# Patient Record
Sex: MALE | Race: WHITE | NOT HISPANIC OR LATINO | Employment: OTHER | URBAN - METROPOLITAN AREA
[De-identification: names, ages, dates, MRNs, and addresses within clinical notes are randomized per-mention and may not be internally consistent; named-entity substitution may affect disease eponyms.]

---

## 2020-06-09 ENCOUNTER — APPOINTMENT (OUTPATIENT)
Dept: GENERAL RADIOLOGY | Facility: HOSPITAL | Age: 40
End: 2020-06-09

## 2020-06-09 ENCOUNTER — HOSPITAL ENCOUNTER (EMERGENCY)
Facility: HOSPITAL | Age: 40
Discharge: HOME | End: 2020-06-09
Admitting: EMERGENCY MEDICINE

## 2020-06-09 VITALS
HEART RATE: 72 BPM | OXYGEN SATURATION: 99 % | HEIGHT: 71 IN | BODY MASS INDEX: 21.27 KG/M2 | RESPIRATION RATE: 15 BRPM | SYSTOLIC BLOOD PRESSURE: 146 MMHG | WEIGHT: 151.9 LBS | DIASTOLIC BLOOD PRESSURE: 82 MMHG | TEMPERATURE: 98.5 F

## 2020-06-09 DIAGNOSIS — S61.412A HAND LACERATION INVOLVING TENDON, LEFT, INITIAL ENCOUNTER: Primary | ICD-10-CM

## 2020-06-09 DIAGNOSIS — S66.922A HAND LACERATION INVOLVING TENDON, LEFT, INITIAL ENCOUNTER: Primary | ICD-10-CM

## 2020-06-09 PROCEDURE — 73130 X-RAY EXAM OF HAND: CPT

## 2020-06-09 PROCEDURE — 25010000002 TDAP 5-2.5-18.5 LF-MCG/0.5 SUSPENSION: Performed by: NURSE PRACTITIONER

## 2020-06-09 PROCEDURE — 99283 EMERGENCY DEPT VISIT LOW MDM: CPT

## 2020-06-09 PROCEDURE — 90471 IMMUNIZATION ADMIN: CPT | Performed by: NURSE PRACTITIONER

## 2020-06-09 PROCEDURE — 90715 TDAP VACCINE 7 YRS/> IM: CPT | Performed by: NURSE PRACTITIONER

## 2020-06-09 RX ORDER — HYDROCODONE BITARTRATE AND ACETAMINOPHEN 5; 325 MG/1; MG/1
1 TABLET ORAL EVERY 6 HOURS PRN
Qty: 12 TABLET | Refills: 0 | Status: SHIPPED | OUTPATIENT
Start: 2020-06-09 | End: 2023-04-04

## 2020-06-09 RX ORDER — CEPHALEXIN 500 MG/1
500 CAPSULE ORAL 4 TIMES DAILY
Qty: 28 CAPSULE | Refills: 0 | Status: SHIPPED | OUTPATIENT
Start: 2020-06-09 | End: 2023-04-04

## 2020-06-09 RX ADMIN — TETANUS TOXOID, REDUCED DIPHTHERIA TOXOID AND ACELLULAR PERTUSSIS VACCINE, ADSORBED 0.5 ML: 5; 2.5; 8; 8; 2.5 SUSPENSION INTRAMUSCULAR at 13:57

## 2020-06-09 NOTE — CONSULTS
Procedure Report    Patient Name:  Juanito Arambula  YOB: 1980    Date of Surgery:  06/09/2020     Indications: Lacerated wound left hand with tear of the extensor tendon of the little finger    Pre-op Diagnosis:   Partial tear of the extensor tendon of the left little finger       Post-Op Diagnosis Codes:  The same    Procedure/CPT® Codes:          Staff:      * Surgery not found *    Anesthesia: * Local anesthesia using 5 cc of 1% lidocaine  IV Fluids         Estimated Blood Loss: Minimal  Tourniquet :  Time: Not used    Pressure: Not used     Findings: Partial laceration of the extensor tendon of the left little finger with open wound    Specimens: None    Complications: None apparent        Description of Procedure: Patient was properly identified PAR Q held wound was thoroughly cleaned and 5 cc of 1% lidocaine was infiltrated around the wound.  The wound was thoroughly irrigated with normal saline and Betadine.  Sterile drapes were applied.  The wound was again examined.  There was no foreign body or foreign material seen.  There is a partial tear of the extensor tendon of the little finger.  The wound was thoroughly cleaned again and the partial tear was repaired using interrupted sutures using 3-0 Ethibond.  The finger was then ranged and found to be satisfactory.  The wound was again thoroughly irrigated and the skin was closed with 3-0 nylon mattress sutures.  Wound was dressed and splint applied.      Standard protocol was observed at the beginning of the procedure identifying the patient's site of surgery nature of surgery including allergies.  At the end of the procedure sponge and needle count was found to be correct.      Post-Op Plan: Postop he will receive oral antibiotics for 1 week.  We will follow him back in 1 week time in the outpatient clinic.  He has been advised to keep his hand elevated.    Sahil Anguiano MD     Date: 6/9/2020    Time: 17:23

## 2020-06-09 NOTE — ED PROVIDER NOTES
"Subjective   Patient is a 39-year-old male who presents emergency department complaint of a hand laceration.  He reports he cut his hand underneath the mcconnell of a car.  He states he is unable to move his finger, his last tetanus immunization he thinks was around 5 years ago, unsure.  He last ate 2 to 3 hours ago          Review of Systems   Constitutional: Negative for fever.   Skin: Positive for wound.   Neurological: Positive for numbness.        Left pinky finger       History reviewed. No pertinent past medical history.    Allergies   Allergen Reactions   • Codeine Shortness Of Breath     Pt reports it \"slows his breathing\"   • Ampicillin Unknown - High Severity     Pt reports he can take amoxicillin and penicillin but not ampicillin       History reviewed. No pertinent surgical history.    No family history on file.    Social History     Socioeconomic History   • Marital status: Single     Spouse name: Not on file   • Number of children: Not on file   • Years of education: Not on file   • Highest education level: Not on file           Objective   Physical Exam   Constitutional: He is oriented to person, place, and time. He appears well-developed and well-nourished.   HENT:   Head: Normocephalic and atraumatic.   Eyes: Pupils are equal, round, and reactive to light. Conjunctivae and EOM are normal.   Neck: Normal range of motion. Neck supple.   Cardiovascular: Normal rate and regular rhythm.   Pulmonary/Chest: Effort normal.   Abdominal: Soft. Bowel sounds are normal.   Musculoskeletal:        Left hand: He exhibits decreased range of motion and laceration. He exhibits no tenderness, no bony tenderness, normal two-point discrimination, normal capillary refill and no deformity. Decreased sensation noted. Decreased strength noted.        Hands:  Deep laceration noted to the left MCP area between the fourth and the fifth digits, extending onto the volar aspect of the fifth digit.  Visible tendon injury is noted.  " "With extensor tendon deficit noted. Cap Refill brisk.    Limited range of motion to the fifth finger, decreased extension with decreased strength.   Neurological: He is alert and oriented to person, place, and time.   Skin: Skin is warm and dry. Capillary refill takes less than 2 seconds.   Psychiatric: He has a normal mood and affect. His behavior is normal. Judgment and thought content normal.   Nursing note and vitals reviewed.      Procedures           ED Course  /85   Pulse 75   Temp 98.2 °F (36.8 °C) (Oral)   Resp 20   Ht 180.3 cm (71\")   Wt 68.9 kg (151 lb 14.4 oz)   SpO2 97%   BMI 21.19 kg/m²   Labs Reviewed - No data to display  Medications   Tdap (BOOSTRIX) injection 0.5 mL (0.5 mL Intramuscular Given 6/9/20 1357)     Xr Hand 3+ View Left    Result Date: 6/9/2020  No acute osseous abnormality. No radiopaque foreign bodies identified.  Electronically Signed By-Ofelia Morales On:6/9/2020 2:26 PM This report was finalized on [id number] by  Ofelia Morales, .      ED Course as of Jun 09 1723   Tue Jun 09, 2020   1610 Dr. Verdugo at bedside.     [LB]   1654 Patient to follow-up with orthopedics in the office on the 16th, he will be placed on Keflex.    [LB]      ED Course User Index  [LB] Koki Graves, FRANSISCA           Appropriate PPE was worn during the duration of the care for this patient while in the emergency department per [facility] guidelines     Patient INSPECT report queried and reviewed.  I discussed with the patient the risk and benefits associated with opiate/narcotic pain medication today.  Based on patient's exam findings and assessment, I do feel it appropriate to prescribe a short course of opiate/ narcotic pain medication from the emergency department.  The patient was advised of the risks associated with such medication, including risk of dependency.  Patient was advised of medication administration instructions, appropriate use, potential side effects and adverse " reactions.  Acknowledged and verbalized understanding, and agrees to treatment.                           MDM  Number of Diagnoses or Management Options  Hand laceration involving tendon, left, initial encounter:   Diagnosis management comments: Co morbidities: None    Differentials: Laceration, tendon laceration, fracture  This list is not all inclusive and does not constitute the entireity of considered causes.   Labs: None    Imaging, Interpreted per radiologist, independently viewed by myself: No acute findings noted on x-ray    Patient was brought back to the emergency department room for evaluation and  placed on appropriate monitoring.  The above work-up was completed.    Plan and Disposition: Discharge home    Patient sustained a deep laceration to his left hand.  Upon initial exam it appeared the patient had a tendon laceration involving the left fifth digit.  Orthopedics was consulted, and Dr. Verdugo completed repair in the ED, please see note.  Will be placed in a splint, placed on Keflex and a short course of pain medication, and follow-up in the office.    I discussed with the patient their test results today, plan of care, follow-up and return precautions.  Opportunities provided as questions.  All questions concerns were addressed at the bedside.    Patient is aware of signs and symptoms that require immediate return to the emergency department.           Amount and/or Complexity of Data Reviewed  Tests in the radiology section of CPT®: reviewed  Discuss the patient with other providers: yes (Dr. Verdugo)    Patient Progress  Patient progress: stable      Final diagnoses:   Hand laceration involving tendon, left, initial encounter            Koki Graves, APRN  06/09/20 1727

## 2020-06-09 NOTE — CONSULTS
Referring Provider: Koki Graves APRN   Reason for Consultation: Extensor tendon laceration left little finger    Patient Care Team:  Provider, No Known as PCP - General    Chief complaint patient accidentally injured his left hand had a laceration at the level of the metacarpophalangeal joint of his left little finger on the dorsal aspect difficulty with extending his little finger.  He says at the time of injury the pain was constant throbbing.    Subjective .     History of present illness: Patient is presents to the [facility] ER with lacerated wound to the dorsal aspect of his left little finger.  He sustained this while working and injured his hand and sustained a deep laceration and was evaluated by the nurse practitioner identified care of the extensor tendon and was referred to me for further evaluation and treatment      History  History reviewed. No pertinent past medical history.    History reviewed. No pertinent surgical history.    No family history on file.    Social History     Tobacco Use   • Smoking status: Not on file   Substance Use Topics   • Alcohol use: Not on file   • Drug use: Not on file         (Not in a hospital admission)     Scheduled Meds:         Continuous Infusions:      No current facility-administered medications for this encounter.      PRN Meds:         Allergies:    Codeine and Ampicillin        Objective     Vital Signs   Temp:  [98.2 °F (36.8 °C)] 98.2 °F (36.8 °C)  Heart Rate:  [] 75  Resp:  [20] 20  BP: (130-164)/(75-92) 130/85    Physical Exam:    General Appearance:    Alert, cooperative, in no acute distress   Head:    Normocephalic, without obvious abnormality, atraumatic   Eyes:            Lids and lashes normal, conjunctivae and sclerae normal, no   icterus, no pallor, corneas clear, PERRLA   Ears:    Ears appear intact with no abnormalities noted   Throat:   No oral lesions, no thrush, oral mucosa moist   Neck:   No adenopathy, supple,  trachea midline, no thyromegaly, no   carotid bruit, no JVD   Back:     No kyphosis present, no scoliosis present, no skin lesions,      erythema or scars, no tenderness to percussion or                   palpation,   range of motion normal   Lungs:     Clear to auscultation,respirations regular, even and                  unlabored    Heart:    Regular rhythm and normal rate, normal S1 and S2, no            murmur, no gallop, no rub, no click   Chest Wall:    No abnormalities observed   Abdomen:     Normal bowel sounds, no masses, no organomegaly, soft        non-tender, non-distended, no guarding, no rebound                tenderness   Rectal:     Deferred   Extremities:  Left upper extremity there is a laceration on the dorsal aspect at the level of the metacarpal phalangeal joint on the dorsal aspect more towards the fourth finger.  Extensor tendon is exposed with partial tear of the tendon.   Pulses:  Radial pulses felt normal.   Skin:   No bleeding, bruising or rash   Lymph nodes:   No palpable adenopathy   Neurologic:  Sensation of the finger ulnar nerve and dorsal aspect are within normal limits.                            Results Review     Lab Results (most recent)     None           Imaging Results (Most Recent)     Procedure Component Value Units Date/Time    XR Hand 3+ View Left [[id number]] Collected:  06/09/20 1425     Updated:  06/09/20 1428    Narrative:       XR HAND 3+ VW LEFT-     Date of Exam: 6/9/2020 2:00 PM     Indication: laceration with tendon laceration.  Laceration of the fourth  and fifth digits     Comparison: None available.      Technique: 3 views of the hand were obtained.     FINDINGS:  No fractures of dislocations.  No soft tissue swelling.  Bone  mineral density is normal. No significant arthritic changes are present.   No periosteal reaction or soft tissue calcifications.  No osseous  erosions. No radiopaque foreign bodies identified.       Impression:       No acute osseous  abnormality. No radiopaque foreign bodies identified.     Electronically Signed By-Ofelia Morales On:6/9/2020 2:26 PM  This report was finalized on [id number] by  Ofelia Morales, .             I reviewed the patient's new clinical results.  I reviewed the patient's new imaging results and agree with the interpretation.      Assessment/Plan       * No active hospital problems. *      Partial tear of the extensor tendon of the little finger.  Plan is to suture it under local anesthesia with Ethibond.  This was explained to the patient in detail patient verbalizes understanding.  Under strict aseptic conditions the wound was thoroughly cleaned and the tendon sutured with a 3-0 Ethibond sutures.  The skin was closed with nylon.  Splint has been applied.  We will follow him back in the outpatient clinic in 1 week time.  Patient will have oral antibiotics for 1 week which would be Kefzol 500 mg p.o. 4 times daily.    I discussed the patient's findings and my recommendations with patient    Sahil Anguiano MD  06/09/20  17:18

## 2020-06-09 NOTE — ED NOTES
Patient stated that he cut the back of his left hand on the mcconnell of a car.  Laceration happened one hour ago.  Patient is having a hard time bending his pinky finger.     Dora Andrade RN  06/09/20 7815       Dora Andrade RN  06/09/20 0505

## 2020-06-09 NOTE — DISCHARGE INSTRUCTIONS
Please follow-up with Dr. Verdugo in the office as scheduled  Return to ED for new or worsening symptoms  Complete entire course of antibiotics

## 2021-06-24 ENCOUNTER — HOSPITAL ENCOUNTER (EMERGENCY)
Facility: HOSPITAL | Age: 41
Discharge: LEFT BEFORE DISCHARGE | End: 2021-06-25

## 2021-06-24 VITALS
OXYGEN SATURATION: 97 % | TEMPERATURE: 98.8 F | DIASTOLIC BLOOD PRESSURE: 72 MMHG | BODY MASS INDEX: 20.68 KG/M2 | SYSTOLIC BLOOD PRESSURE: 105 MMHG | HEART RATE: 102 BPM | HEIGHT: 71 IN | WEIGHT: 147.71 LBS | RESPIRATION RATE: 16 BRPM

## 2021-06-24 PROCEDURE — 99211 OFF/OP EST MAY X REQ PHY/QHP: CPT

## 2021-07-02 ENCOUNTER — APPOINTMENT (OUTPATIENT)
Dept: GENERAL RADIOLOGY | Facility: HOSPITAL | Age: 41
End: 2021-07-02

## 2021-07-02 ENCOUNTER — HOSPITAL ENCOUNTER (EMERGENCY)
Facility: HOSPITAL | Age: 41
Discharge: HOME | End: 2021-07-02
Attending: EMERGENCY MEDICINE | Admitting: EMERGENCY MEDICINE

## 2021-07-02 VITALS
OXYGEN SATURATION: 100 % | RESPIRATION RATE: 16 BRPM | SYSTOLIC BLOOD PRESSURE: 137 MMHG | HEIGHT: 71 IN | WEIGHT: 146.16 LBS | HEART RATE: 93 BPM | DIASTOLIC BLOOD PRESSURE: 85 MMHG | TEMPERATURE: 98 F | BODY MASS INDEX: 20.46 KG/M2

## 2021-07-02 DIAGNOSIS — M25.512 ACUTE PAIN OF LEFT SHOULDER: Primary | ICD-10-CM

## 2021-07-02 PROCEDURE — 73030 X-RAY EXAM OF SHOULDER: CPT

## 2021-07-02 PROCEDURE — 99282 EMERGENCY DEPT VISIT SF MDM: CPT

## 2021-07-02 RX ORDER — CYCLOBENZAPRINE HCL 10 MG
10 TABLET ORAL 3 TIMES DAILY PRN
Qty: 15 TABLET | Refills: 0 | Status: SHIPPED | OUTPATIENT
Start: 2021-07-02 | End: 2023-04-04

## 2021-07-02 RX ORDER — NAPROXEN 375 MG/1
375 TABLET ORAL 2 TIMES DAILY PRN
Qty: 14 TABLET | Refills: 0 | Status: SHIPPED | OUTPATIENT
Start: 2021-07-02 | End: 2023-04-04

## 2021-07-03 NOTE — ED PROVIDER NOTES
"Subjective   Patient is a 40-year-old male complaint of pain to his left shoulder.  This developed over the past several days.  No specific injury numbness tingling or other complaint.  The pain is mild but constant          Review of Systems  Negative for recent trauma numbness tingling weakness or other complaint  No past medical history on file.    Allergies   Allergen Reactions   • Codeine Shortness Of Breath     Pt reports it \"slows his breathing\"   • Ampicillin Unknown - High Severity     Pt reports he can take amoxicillin and penicillin but not ampicillin       Past Surgical History:   Procedure Laterality Date   • ADENOIDECTOMY     • HERNIA REPAIR     • TONSILLECTOMY         No family history on file.    Social History     Socioeconomic History   • Marital status: Single     Spouse name: Not on file   • Number of children: Not on file   • Years of education: Not on file   • Highest education level: Not on file   Tobacco Use   • Smoking status: Current Every Day Smoker     Packs/day: 0.50     Types: Cigarettes   Substance and Sexual Activity   • Alcohol use: Yes     Comment: socially    • Drug use: Yes     Frequency: 3.0 times per week     Types: Marijuana   • Sexual activity: Never           Objective   Physical Exam  Strohman exam shows pain to palpation about the patient's left posterior shoulder.  Has full range of motion with mild pain.  He has 2+ pulses and is neurovascularly intact.  Procedures           ED Course      XR Shoulder 2+ View Left    Result Date: 7/2/2021  No acute fracture or dislocation is identified of the left shoulder.  Electronically Signed By-Arti Pulliam MD On:7/2/2021 8:39 PM This report was finalized on [id number] by  Arti Pulliam MD.                                         MDM  Number of Diagnoses or Management Options  Diagnosis management comments: Patient has findings consistent with left shoulder strain.  Patient will be discharged with a prescription Naprosyn and " Flexeril.  He see his MD for recheck as needed.    Risk of Complications, Morbidity, and/or Mortality  Presenting problems: moderate  Diagnostic procedures: moderate  Management options: moderate    Patient Progress  Patient progress: stable      Final diagnoses:   Acute pain of left shoulder       ED Disposition  ED Disposition     ED Disposition Condition Comment    Discharge Stable           No follow-up provider specified.       Medication List      New Prescriptions    cyclobenzaprine 10 MG tablet  Commonly known as: FLEXERIL  Take 1 tablet by mouth 3 (Three) Times a Day As Needed for Muscle Spasms for up to 15 doses.     naproxen 375 MG tablet  Commonly known as: NAPROSYN  Take 1 tablet by mouth 2 (Two) Times a Day As Needed for Moderate Pain .           Where to Get Your Medications      These medications were sent to [id number] - [facility] [place] - [address] AT [facility] - [phone]  - [phone] FX  [address] [place] [address]    Phone: [phone]   · cyclobenzaprine 10 MG tablet  · naproxen 375 MG tablet          Gianfranco Romero MD  07/02/21 4184

## 2023-04-04 ENCOUNTER — HOSPITAL ENCOUNTER (OUTPATIENT)
Facility: HOSPITAL | Age: 43
Setting detail: OBSERVATION
Discharge: LEFT BEFORE DISCHARGE | End: 2023-04-05
Attending: EMERGENCY MEDICINE | Admitting: EMERGENCY MEDICINE
Payer: MEDICAID

## 2023-04-04 DIAGNOSIS — R07.9 CHEST PAIN, UNSPECIFIED TYPE: Primary | ICD-10-CM

## 2023-04-04 LAB
ALBUMIN SERPL-MCNC: 4.4 G/DL (ref 3.5–5.2)
ALBUMIN/GLOB SERPL: 1.8 G/DL
ALP SERPL-CCNC: 106 U/L (ref 39–117)
ALT SERPL W P-5'-P-CCNC: 15 U/L (ref 1–41)
AMPHET+METHAMPHET UR QL: NEGATIVE
ANION GAP SERPL CALCULATED.3IONS-SCNC: 10 MMOL/L (ref 5–15)
APTT PPP: 27.7 SECONDS (ref 61–76.5)
AST SERPL-CCNC: 22 U/L (ref 1–40)
BARBITURATES UR QL SCN: NEGATIVE
BASOPHILS # BLD AUTO: 0.1 10*3/MM3 (ref 0–0.2)
BASOPHILS NFR BLD AUTO: 0.5 % (ref 0–1.5)
BENZODIAZ UR QL SCN: NEGATIVE
BILIRUB SERPL-MCNC: 0.8 MG/DL (ref 0–1.2)
BILIRUB UR QL STRIP: NEGATIVE
BUN SERPL-MCNC: 15 MG/DL (ref 6–20)
BUN/CREAT SERPL: 16.9 (ref 7–25)
CALCIUM SPEC-SCNC: 9.7 MG/DL (ref 8.6–10.5)
CANNABINOIDS SERPL QL: POSITIVE
CHLORIDE SERPL-SCNC: 102 MMOL/L (ref 98–107)
CLARITY UR: CLEAR
CO2 SERPL-SCNC: 26 MMOL/L (ref 22–29)
COCAINE UR QL: NEGATIVE
COLOR UR: YELLOW
CREAT SERPL-MCNC: 0.89 MG/DL (ref 0.76–1.27)
DEPRECATED RDW RBC AUTO: 50.8 FL (ref 37–54)
EGFRCR SERPLBLD CKD-EPI 2021: 108.4 ML/MIN/1.73
EOSINOPHIL # BLD AUTO: 0.2 10*3/MM3 (ref 0–0.4)
EOSINOPHIL NFR BLD AUTO: 2 % (ref 0.3–6.2)
ERYTHROCYTE [DISTWIDTH] IN BLOOD BY AUTOMATED COUNT: 13.9 % (ref 12.3–15.4)
ERYTHROCYTE [SEDIMENTATION RATE] IN BLOOD: 2 MM/HR (ref 0–15)
GEN 5 2HR TROPONIN T REFLEX: <6 NG/L
GLOBULIN UR ELPH-MCNC: 2.5 GM/DL
GLUCOSE SERPL-MCNC: 107 MG/DL (ref 65–99)
GLUCOSE UR STRIP-MCNC: NEGATIVE MG/DL
HCT VFR BLD AUTO: 49.8 % (ref 37.5–51)
HGB BLD-MCNC: 16.7 G/DL (ref 13–17.7)
HGB UR QL STRIP.AUTO: NEGATIVE
INR PPP: 1.01 (ref 0.93–1.1)
KETONES UR QL STRIP: NEGATIVE
LEUKOCYTE ESTERASE UR QL STRIP.AUTO: NEGATIVE
LYMPHOCYTES # BLD AUTO: 2.1 10*3/MM3 (ref 0.7–3.1)
LYMPHOCYTES NFR BLD AUTO: 20.7 % (ref 19.6–45.3)
MCH RBC QN AUTO: 33.1 PG (ref 26.6–33)
MCHC RBC AUTO-ENTMCNC: 33.5 G/DL (ref 31.5–35.7)
MCV RBC AUTO: 98.8 FL (ref 79–97)
METHADONE UR QL SCN: NEGATIVE
MONOCYTES # BLD AUTO: 0.9 10*3/MM3 (ref 0.1–0.9)
MONOCYTES NFR BLD AUTO: 8.7 % (ref 5–12)
NEUTROPHILS NFR BLD AUTO: 6.8 10*3/MM3 (ref 1.7–7)
NEUTROPHILS NFR BLD AUTO: 68.1 % (ref 42.7–76)
NITRITE UR QL STRIP: NEGATIVE
NRBC BLD AUTO-RTO: 0.1 /100 WBC (ref 0–0.2)
OPIATES UR QL: NEGATIVE
OXYCODONE UR QL SCN: NEGATIVE
PH UR STRIP.AUTO: 5.5 [PH] (ref 5–8)
PLATELET # BLD AUTO: 218 10*3/MM3 (ref 140–450)
PMV BLD AUTO: 9.4 FL (ref 6–12)
POTASSIUM SERPL-SCNC: 5 MMOL/L (ref 3.5–5.2)
PROT SERPL-MCNC: 6.9 G/DL (ref 6–8.5)
PROT UR QL STRIP: NEGATIVE
PROTHROMBIN TIME: 10.4 SECONDS (ref 9.6–11.7)
RBC # BLD AUTO: 5.04 10*6/MM3 (ref 4.14–5.8)
SODIUM SERPL-SCNC: 138 MMOL/L (ref 136–145)
SP GR UR STRIP: 1.02 (ref 1–1.03)
TROPONIN T DELTA: NORMAL
TROPONIN T SERPL HS-MCNC: 7 NG/L
UROBILINOGEN UR QL STRIP: NORMAL
WBC NRBC COR # BLD: 10 10*3/MM3 (ref 3.4–10.8)
WHOLE BLOOD HOLD COAG: NORMAL
WHOLE BLOOD HOLD SPECIMEN: NORMAL

## 2023-04-04 PROCEDURE — 93005 ELECTROCARDIOGRAM TRACING: CPT

## 2023-04-04 PROCEDURE — 80307 DRUG TEST PRSMV CHEM ANLYZR: CPT | Performed by: NURSE PRACTITIONER

## 2023-04-04 PROCEDURE — 80053 COMPREHEN METABOLIC PANEL: CPT | Performed by: NURSE PRACTITIONER

## 2023-04-04 PROCEDURE — 85610 PROTHROMBIN TIME: CPT | Performed by: NURSE PRACTITIONER

## 2023-04-04 PROCEDURE — 85025 COMPLETE CBC W/AUTO DIFF WBC: CPT | Performed by: NURSE PRACTITIONER

## 2023-04-04 PROCEDURE — 81003 URINALYSIS AUTO W/O SCOPE: CPT | Performed by: NURSE PRACTITIONER

## 2023-04-04 PROCEDURE — 85730 THROMBOPLASTIN TIME PARTIAL: CPT | Performed by: NURSE PRACTITIONER

## 2023-04-04 PROCEDURE — 99285 EMERGENCY DEPT VISIT HI MDM: CPT

## 2023-04-04 PROCEDURE — G0378 HOSPITAL OBSERVATION PER HR: HCPCS

## 2023-04-04 PROCEDURE — 36415 COLL VENOUS BLD VENIPUNCTURE: CPT

## 2023-04-04 PROCEDURE — 84484 ASSAY OF TROPONIN QUANT: CPT | Performed by: NURSE PRACTITIONER

## 2023-04-04 PROCEDURE — 85652 RBC SED RATE AUTOMATED: CPT | Performed by: NURSE PRACTITIONER

## 2023-04-04 PROCEDURE — 93005 ELECTROCARDIOGRAM TRACING: CPT | Performed by: EMERGENCY MEDICINE

## 2023-04-04 RX ORDER — CLONIDINE HYDROCHLORIDE 0.1 MG/1
0.1 TABLET ORAL 2 TIMES DAILY
COMMUNITY

## 2023-04-04 RX ORDER — NITROGLYCERIN 0.4 MG/1
0.4 TABLET SUBLINGUAL
Status: DISCONTINUED | OUTPATIENT
Start: 2023-04-04 | End: 2023-04-05 | Stop reason: HOSPADM

## 2023-04-04 RX ORDER — SODIUM CHLORIDE 0.9 % (FLUSH) 0.9 %
10 SYRINGE (ML) INJECTION AS NEEDED
Status: DISCONTINUED | OUTPATIENT
Start: 2023-04-04 | End: 2023-04-05 | Stop reason: HOSPADM

## 2023-04-04 RX ADMIN — NITROGLYCERIN 1 INCH: 20 OINTMENT TOPICAL at 19:00

## 2023-04-04 RX ADMIN — NITROGLYCERIN 0.4 MG: 0.4 TABLET SUBLINGUAL at 18:33

## 2023-04-04 NOTE — ED PROVIDER NOTES
"Subjective   History of Present Illness  Chief complaint: chest pain      Context: Patient is a 42-year-old male from [facility] with complaints of chest pain for the last 3 days.  He states he has a squeezing sensation substernal nonradiating.  No associated nausea or diaphoresis.  States he has had increased stress last couple days after being at sunrise.  Does note some exertional dyspnea as well.  Denies any symmetrical pedal edema.  He denies any shortness of breath fever or upper respiratory complaints.  No urinary complaints.  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  He reports positive family history of cardiac disease has never had a stress test or heart cath.  He has a history of hypertension without known history of hyperlipidemia or diabetes.        PCP:           Review of Systems   Constitutional: Negative for fever.   Respiratory: Negative for shortness of breath.    Cardiovascular: Positive for chest pain.   Gastrointestinal: Negative.        No past medical history on file.    Allergies   Allergen Reactions   • Codeine Shortness Of Breath     Pt reports it \"slows his breathing\"   • Ampicillin Unknown - High Severity     Pt reports he can take amoxicillin and penicillin but not ampicillin       Past Surgical History:   Procedure Laterality Date   • ADENOIDECTOMY     • HERNIA REPAIR     • TONSILLECTOMY         No family history on file.    Social History     Socioeconomic History   • Marital status: Single   Tobacco Use   • Smoking status: Every Day     Packs/day: 0.50     Types: Cigarettes   Substance and Sexual Activity   • Alcohol use: Yes     Comment: socially    • Drug use: Yes     Frequency: 3.0 times per week     Types: Marijuana   • Sexual activity: Never           Objective   Physical Exam       Vital signs and triage nurse note reviewed.  Constitutional: Awake, alert; well-developed and " well-nourished.  Thin.  Nontoxic  HEENT: Normocephalic, atraumatic; pupils are PERRL with intact EOM; oropharynx is pink and moist without exudate or erythema.Edentulous  Neck: Supple, full range of motion without pain;    Cardiovascular: Regular rate and rhythm, normal S1-S2.  Pulmonary: Respiratory effort regular nonlabored, breath sounds clear to auscultation all fields.  Abdomen: Soft, nontender nondistended with normoactive bowel sounds; no rebound or guarding.  Musculoskeletal: Independent range of motion of all extremities with no palpable tenderness or edema.  Neuro: Alert oriented x3, speech is clear and appropriate, GCS 15  Skin:  Fleshtone warm, dry, intact; no erythematous or petechial rash or lesion      Labs Reviewed   COMPREHENSIVE METABOLIC PANEL - Abnormal; Notable for the following components:       Result Value    Glucose 107 (*)     All other components within normal limits    Narrative:     GFR Normal >60  Chronic Kidney Disease <60  Kidney Failure <15     APTT - Abnormal; Notable for the following components:    PTT 27.7 (*)     All other components within normal limits   CBC WITH AUTO DIFFERENTIAL - Abnormal; Notable for the following components:    MCV 98.8 (*)     MCH 33.1 (*)     All other components within normal limits   PROTIME-INR - Normal   TROPONIN - Normal    Narrative:     High Sensitive Troponin T Reference Range:  <10.0 ng/L- Negative Female for AMI  <15.0 ng/L- Negative Male for AMI  >=10 - Abnormal Female indicating possible myocardial injury.  >=15 - Abnormal Male indicating possible myocardial injury.   Clinicians would have to utilize clinical acumen, EKG, Troponin, and serial changes to determine if it is an Acute Myocardial Infarction or myocardial injury due to an underlying chronic condition.        SEDIMENTATION RATE - Normal   RAINBOW DRAW    Narrative:     The following orders were created for panel order Aneta Draw.  Procedure                                Abnormality         Status                     ---------                               -----------         ------                     Green Top (Gel)[[id number]]                                  Final result               Lavender Top[[id number]]                                     In process                 Gold Top - SST[[id number]]                                   Final result               Light Blue Top[[id number]]                                   In process                   Please view results for these tests on the individual orders.   URINALYSIS W/ MICROSCOPIC IF INDICATED (NO CULTURE)   URINE DRUG SCREEN   HIGH SENSITIVITIY TROPONIN T 2HR   GREEN TOP   GOLD TOP - SST   CBC AND DIFFERENTIAL    Narrative:     The following orders were created for panel order CBC & Differential.  Procedure                               Abnormality         Status                     ---------                               -----------         ------                     CBC Auto Differential[[id number]]        Abnormal            Final result                 Please view results for these tests on the individual orders.   LAVENDER TOP   LIGHT BLUE TOP     Medications   sodium chloride 0.9 % flush 10 mL (has no administration in time range)   nitroglycerin (NITROSTAT) SL tablet 0.4 mg (0.4 mg Sublingual Given 4/4/23 1833)   nitroglycerin (NITROSTAT) ointment 1 inch (has no administration in time range)     No radiology results for the last day  Prior to Admission medications    Medication Sig Start Date End Date Taking? Authorizing Provider   cephalexin (KEFLEX) 500 MG capsule Take 1 capsule by mouth 4 (Four) Times a Day. 6/9/20   Koki Graves APRN   cyclobenzaprine (FLEXERIL) 10 MG tablet Take 1 tablet by mouth 3 (Three) Times a Day As Needed for Muscle Spasms for up to 15 doses. 7/2/21   Gianfranco Romero MD   HYDROcodone-acetaminophen (NORCO) 5-325 MG per tablet Take 1 tablet by mouth Every 6 (Six) Hours As Needed for  "Moderate Pain . 6/9/20   Koki Graves Nathaniel, APRN   naproxen (NAPROSYN) 375 MG tablet Take 1 tablet by mouth 2 (Two) Times a Day As Needed for Moderate Pain . 7/2/21   Gianfranco Romero MD         Procedures           ED Course                                           Medical Decision Making      /87   Pulse 66   Temp 98.5 °F (36.9 °C) (Oral)   Resp 16   Ht 180.3 cm (71\")   Wt 68 kg (150 lb)   SpO2 100%   BMI 20.92 kg/m²         Radiology interpretation:  X-rays reviewed independently and interpreted by me: No focal infiltrates  Further interpretation by radiologist as above  Lab interpretation:  Labs all viewed by me and significant for, glucose 107, initial troponin 7, ESR 2, blood cell count 10    EKG viewed and interpreted by me and corroborated by Dr. Tovar, sinus rhythm rate of 71 biatrial enlargement  comparison: 6/2/2013 sinus rhythm rate of 95            Appropriate PPE worn during exam.  Patient had an IV established labs x-ray EKG was obtained to evaluate for STEMI NSTEMI angina electrolyte abnormalities infection pulmonary edema..  He was given aspirin in route per EMS.  After 1 sublingual nitro patient states his pain is significantly improved and Nitropaste was applied.  He will be placed in observation for stress and echo in the morning.         i discussed findings with patient who voices understanding of placement observation  This document is intended for medical expert use only. Reading of this document by patients and/or patient's family without participating medical staff guidance may result in misinterpretation and unintended morbidity.  Any interpretation of such data is the responsibility of the patient and/or family member responsible for the patient in concert with their primary or specialist providers, not to be left for sources of online searches such as [employer], nScaled or similar queries. Relying on these approaches to knowledge may result in misinterpretation, misguided " goals of care and even death should patients or family members try recommendations outside of the realm of professional medical care in a supervised inpatient environment.     Discussed with Dr. Tovar    Chest pain, unspecified type: complicated acute illness or injury  Amount and/or Complexity of Data Reviewed  Labs: ordered.      Risk  Prescription drug management.  Decision regarding hospitalization.          Final diagnoses:   Chest pain, unspecified type       ED Disposition  ED Disposition     ED Disposition   Decision to Admit    Condition   --    Comment   --             No follow-up provider specified.       Medication List      No changes were made to your prescriptions during this visit.          Loida Shen, APRN  04/04/23 1852

## 2023-04-05 ENCOUNTER — APPOINTMENT (OUTPATIENT)
Dept: GENERAL RADIOLOGY | Facility: HOSPITAL | Age: 43
End: 2023-04-05
Payer: MEDICAID

## 2023-04-05 ENCOUNTER — APPOINTMENT (OUTPATIENT)
Dept: NUCLEAR MEDICINE | Facility: HOSPITAL | Age: 43
End: 2023-04-05
Payer: MEDICAID

## 2023-04-05 ENCOUNTER — APPOINTMENT (OUTPATIENT)
Dept: CARDIOLOGY | Facility: HOSPITAL | Age: 43
End: 2023-04-05
Payer: MEDICAID

## 2023-04-05 VITALS
OXYGEN SATURATION: 98 % | BODY MASS INDEX: 21 KG/M2 | HEIGHT: 71 IN | DIASTOLIC BLOOD PRESSURE: 95 MMHG | SYSTOLIC BLOOD PRESSURE: 143 MMHG | HEART RATE: 80 BPM | TEMPERATURE: 98 F | WEIGHT: 150 LBS | RESPIRATION RATE: 16 BRPM

## 2023-04-05 LAB
ANION GAP SERPL CALCULATED.3IONS-SCNC: 8 MMOL/L (ref 5–15)
BASOPHILS # BLD AUTO: 0.1 10*3/MM3 (ref 0–0.2)
BASOPHILS NFR BLD AUTO: 0.8 % (ref 0–1.5)
BH CV ECHO MEAS - ACS: 2 CM
BH CV ECHO MEAS - AO MAX PG: 4.9 MMHG
BH CV ECHO MEAS - AO MEAN PG: 3 MMHG
BH CV ECHO MEAS - AO V2 MAX: 111 CM/SEC
BH CV ECHO MEAS - AO V2 VTI: 19.2 CM
BH CV ECHO MEAS - AVA(I,D): 3.2 CM2
BH CV ECHO MEAS - EDV(CUBED): 74.1 ML
BH CV ECHO MEAS - EDV(MOD-SP4): 125 ML
BH CV ECHO MEAS - EF(MOD-BP): 34 %
BH CV ECHO MEAS - EF(MOD-SP4): 33.9 %
BH CV ECHO MEAS - EF_3D-VOL: 41 %
BH CV ECHO MEAS - ESV(CUBED): 42.9 ML
BH CV ECHO MEAS - ESV(MOD-SP4): 82.6 ML
BH CV ECHO MEAS - FS: 16.7 %
BH CV ECHO MEAS - IVS/LVPW: 1 CM
BH CV ECHO MEAS - IVSD: 0.8 CM
BH CV ECHO MEAS - LA DIMENSION: 2.3 CM
BH CV ECHO MEAS - LAT PEAK E' VEL: 12.5 CM/SEC
BH CV ECHO MEAS - LV DIASTOLIC VOL/BSA (35-75): 67 CM2
BH CV ECHO MEAS - LV MASS(C)D: 101.3 GRAMS
BH CV ECHO MEAS - LV MAX PG: 4 MMHG
BH CV ECHO MEAS - LV MEAN PG: 2 MMHG
BH CV ECHO MEAS - LV SYSTOLIC VOL/BSA (12-30): 44.3 CM2
BH CV ECHO MEAS - LV V1 MAX: 100 CM/SEC
BH CV ECHO MEAS - LV V1 VTI: 19.6 CM
BH CV ECHO MEAS - LVIDD: 4.2 CM
BH CV ECHO MEAS - LVIDS: 3.5 CM
BH CV ECHO MEAS - LVOT AREA: 3.1 CM2
BH CV ECHO MEAS - LVOT DIAM: 2 CM
BH CV ECHO MEAS - LVPWD: 0.8 CM
BH CV ECHO MEAS - MED PEAK E' VEL: 7.8 CM/SEC
BH CV ECHO MEAS - MV A MAX VEL: 55.5 CM/SEC
BH CV ECHO MEAS - MV DEC SLOPE: 153 CM/SEC2
BH CV ECHO MEAS - MV DEC TIME: 0.26 MSEC
BH CV ECHO MEAS - MV E MAX VEL: 47.7 CM/SEC
BH CV ECHO MEAS - MV E/A: 0.86
BH CV ECHO MEAS - MV MAX PG: 2.33 MMHG
BH CV ECHO MEAS - MV MEAN PG: 1 MMHG
BH CV ECHO MEAS - MV P1/2T: 153.5 MSEC
BH CV ECHO MEAS - MV V2 VTI: 26.9 CM
BH CV ECHO MEAS - MVA(P1/2T): 1.43 CM2
BH CV ECHO MEAS - MVA(VTI): 2.29 CM2
BH CV ECHO MEAS - PA ACC TIME: 0.13 SEC
BH CV ECHO MEAS - PA PR(ACCEL): 19.6 MMHG
BH CV ECHO MEAS - PA V2 MAX: 106 CM/SEC
BH CV ECHO MEAS - RV MAX PG: 1.44 MMHG
BH CV ECHO MEAS - RV V1 MAX: 59.9 CM/SEC
BH CV ECHO MEAS - RV V1 VTI: 12.6 CM
BH CV ECHO MEAS - RVDD: 3.2 CM
BH CV ECHO MEAS - SI(MOD-SP4): 22.7 ML/M2
BH CV ECHO MEAS - SV(LVOT): 61.6 ML
BH CV ECHO MEAS - SV(MOD-SP4): 42.4 ML
BH CV ECHO MEAS - TAPSE (>1.6): 2.26 CM
BH CV ECHO MEAS - TR MAX PG: 60.2 MMHG
BH CV ECHO MEAS - TR MAX VEL: 388 CM/SEC
BH CV ECHO MEASUREMENTS AVERAGE E/E' RATIO: 4.7
BH CV REST NUCLEAR ISOTOPE DOSE: 11 MCI
BH CV STRESS NUCLEAR ISOTOPE DOSE: 33 MCI
BH CV XLRA - TDI S': 14.5 CM/SEC
BUN SERPL-MCNC: 17 MG/DL (ref 6–20)
BUN/CREAT SERPL: 17.5 (ref 7–25)
CALCIUM SPEC-SCNC: 9.2 MG/DL (ref 8.6–10.5)
CHLORIDE SERPL-SCNC: 105 MMOL/L (ref 98–107)
CHOLEST SERPL-MCNC: 152 MG/DL (ref 0–200)
CO2 SERPL-SCNC: 29 MMOL/L (ref 22–29)
CREAT SERPL-MCNC: 0.97 MG/DL (ref 0.76–1.27)
D DIMER PPP FEU-MCNC: <0.19 MG/L (FEU) (ref 0–0.5)
DEPRECATED RDW RBC AUTO: 51.6 FL (ref 37–54)
EGFRCR SERPLBLD CKD-EPI 2021: 98.7 ML/MIN/1.73
EOSINOPHIL # BLD AUTO: 0.2 10*3/MM3 (ref 0–0.4)
EOSINOPHIL NFR BLD AUTO: 2.2 % (ref 0.3–6.2)
ERYTHROCYTE [DISTWIDTH] IN BLOOD BY AUTOMATED COUNT: 14.4 % (ref 12.3–15.4)
GLUCOSE SERPL-MCNC: 113 MG/DL (ref 65–99)
HCT VFR BLD AUTO: 50.5 % (ref 37.5–51)
HDLC SERPL-MCNC: 45 MG/DL (ref 40–60)
HGB BLD-MCNC: 15.8 G/DL (ref 13–17.7)
LDLC SERPL CALC-MCNC: 85 MG/DL (ref 0–100)
LDLC/HDLC SERPL: 1.82 {RATIO}
LEFT ATRIUM VOLUME INDEX: 14 ML/M2
LYMPHOCYTES # BLD AUTO: 1.8 10*3/MM3 (ref 0.7–3.1)
LYMPHOCYTES NFR BLD AUTO: 21.7 % (ref 19.6–45.3)
MAXIMAL PREDICTED HEART RATE: 176 BPM
MAXIMAL PREDICTED HEART RATE: 176 BPM
MCH RBC QN AUTO: 32.1 PG (ref 26.6–33)
MCHC RBC AUTO-ENTMCNC: 31.4 G/DL (ref 31.5–35.7)
MCV RBC AUTO: 102.3 FL (ref 79–97)
MONOCYTES # BLD AUTO: 0.9 10*3/MM3 (ref 0.1–0.9)
MONOCYTES NFR BLD AUTO: 11.2 % (ref 5–12)
NEUTROPHILS NFR BLD AUTO: 5.3 10*3/MM3 (ref 1.7–7)
NEUTROPHILS NFR BLD AUTO: 64.1 % (ref 42.7–76)
NRBC BLD AUTO-RTO: 0.1 /100 WBC (ref 0–0.2)
PLATELET # BLD AUTO: 215 10*3/MM3 (ref 140–450)
PMV BLD AUTO: 9.3 FL (ref 6–12)
POTASSIUM SERPL-SCNC: 4.5 MMOL/L (ref 3.5–5.2)
RBC # BLD AUTO: 4.93 10*6/MM3 (ref 4.14–5.8)
SINUS: 3 CM
SODIUM SERPL-SCNC: 142 MMOL/L (ref 136–145)
STJ: 2.5 CM
STRESS POST ESTIMATED WORKLOAD: 7 METS
STRESS POST EXERCISE DUR MIN: 6 MIN
STRESS POST EXERCISE DUR SEC: 1 SEC
STRESS TARGET HR: 150 BPM
STRESS TARGET HR: 150 BPM
TRIGL SERPL-MCNC: 125 MG/DL (ref 0–150)
VLDLC SERPL-MCNC: 22 MG/DL (ref 5–40)
WBC NRBC COR # BLD: 8.2 10*3/MM3 (ref 3.4–10.8)

## 2023-04-05 PROCEDURE — 71045 X-RAY EXAM CHEST 1 VIEW: CPT

## 2023-04-05 PROCEDURE — 93017 CV STRESS TEST TRACING ONLY: CPT

## 2023-04-05 PROCEDURE — 93018 CV STRESS TEST I&R ONLY: CPT | Performed by: INTERNAL MEDICINE

## 2023-04-05 PROCEDURE — G0378 HOSPITAL OBSERVATION PER HR: HCPCS

## 2023-04-05 PROCEDURE — 93306 TTE W/DOPPLER COMPLETE: CPT

## 2023-04-05 PROCEDURE — 99232 SBSQ HOSP IP/OBS MODERATE 35: CPT | Performed by: INTERNAL MEDICINE

## 2023-04-05 PROCEDURE — 80061 LIPID PANEL: CPT | Performed by: PHYSICIAN ASSISTANT

## 2023-04-05 PROCEDURE — 78452 HT MUSCLE IMAGE SPECT MULT: CPT | Performed by: INTERNAL MEDICINE

## 2023-04-05 PROCEDURE — 80048 BASIC METABOLIC PNL TOTAL CA: CPT | Performed by: NURSE PRACTITIONER

## 2023-04-05 PROCEDURE — 93306 TTE W/DOPPLER COMPLETE: CPT | Performed by: INTERNAL MEDICINE

## 2023-04-05 PROCEDURE — 0 TECHNETIUM TETROFOSMIN KIT: Performed by: EMERGENCY MEDICINE

## 2023-04-05 PROCEDURE — 85379 FIBRIN DEGRADATION QUANT: CPT | Performed by: NURSE PRACTITIONER

## 2023-04-05 PROCEDURE — 78452 HT MUSCLE IMAGE SPECT MULT: CPT

## 2023-04-05 PROCEDURE — A9502 TC99M TETROFOSMIN: HCPCS | Performed by: EMERGENCY MEDICINE

## 2023-04-05 PROCEDURE — 85025 COMPLETE CBC W/AUTO DIFF WBC: CPT | Performed by: NURSE PRACTITIONER

## 2023-04-05 RX ORDER — ENOXAPARIN SODIUM 100 MG/ML
40 INJECTION SUBCUTANEOUS DAILY
Status: DISCONTINUED | OUTPATIENT
Start: 2023-04-05 | End: 2023-04-05 | Stop reason: HOSPADM

## 2023-04-05 RX ORDER — CLONIDINE HYDROCHLORIDE 0.1 MG/1
0.1 TABLET ORAL 2 TIMES DAILY
Status: DISCONTINUED | OUTPATIENT
Start: 2023-04-05 | End: 2023-04-05 | Stop reason: HOSPADM

## 2023-04-05 RX ADMIN — TETROFOSMIN 1 DOSE: 1.38 INJECTION, POWDER, LYOPHILIZED, FOR SOLUTION INTRAVENOUS at 07:02

## 2023-04-05 RX ADMIN — CLONIDINE HYDROCHLORIDE 0.1 MG: 0.1 TABLET ORAL at 10:55

## 2023-04-05 NOTE — CASE MANAGEMENT/SOCIAL WORK
Social Work Assessment   [place]     Patient Name: Juanito Arambula  MRN: [id number]  Today's Date: 4/5/2023    Admit Date: 4/4/2023     Discharge Needs Assessment     Row Name 04/05/23 1527       Living Environment    People in Home significant other    Name(s) of People in Home Patient from [facility] but going home at d/c. Nenita Madrid, Significant other.    Current Living Arrangements other (see comments)  Patient was [facility] at [facility] and will be staying in an apartment with his girlfriend upon d/c    Potentially Unsafe Housing Conditions none    Primary Care Provided by self    Provides Primary Care For no one    Family Caregiver if Needed none    Quality of Family Relationships supportive    Able to Return to Prior Arrangements yes    Living Arrangement Comments Patient was living at [facility] and will be staying in an apartment with his girlfriend upon d/c       Resource/Environmental Concerns    Resource/Environmental Concerns none    Transportation Concerns none       Transportation Needs    In the past 12 months, has lack of transportation kept you from medical appointments or from getting medications? no    In the past 12 months, has lack of transportation kept you from meetings, work, or from getting things needed for daily living? No       Food Insecurity    Within the past 12 months, you worried that your food would run out before you got the money to buy more. Sometimes    Within the past 12 months, the food you bought just didn't last and you didn't have money to get more. Sometimes       Transition Planning    Patient/Family Anticipates Transition to --  Home and admitting to an intensive outpatient rehab program.    Patient/Family Anticipated Services at Transition none    Transportation Anticipated family or friend will provide       Discharge Needs Assessment    Readmission Within the Last 30 Days no previous admission in last 30 days    Current Outpatient/Agency/Support Group  inpatient rehabilitation facility    Equipment Currently Used at Home none    Concerns to be Addressed care coordination/care conferences;basic needs    Anticipated Changes Related to Illness none    Equipment Needed After Discharge none    Discharge Coordination/Progress Patient to d/c home and admitted to an intensive outpatient rehab program.               Discharge Plan     Row Name 04/05/23 1531       Plan    Plan D/C Plan: Home and patient admitted to IOP program.    Plan Comments LSW met with patient at bedside. Completed CM assessment, LACE, and SDOH. Patient reported he has been speaking with a liason from [facility] and they have a spot for him with their IOP program. Patient reported he has his license but does not have a car. He stated he his significant other has a car and she can pick him up at d/c. Stated he does not have a PCP but picks up his Rxs from [employer]. No other concerns at this time.              Continued Care and Services - Admitted Since 4/4/2023    Coordination has not been started for this encounter.       Expected Discharge Date and Time     Expected Discharge Date Expected Discharge Time    Apr 6, 2023          Demographic Summary     Row Name 04/05/23 1426       General Information    Admission Type observation    Arrived From other (see comments);emergency department    Referral Source admission list    Reason for Consult care coordination/care conference;discharge planning;substance use concerns;facility placement    Preferred Language English    General Information Comments Patient is from [facility] inpatient rehab. However, does not want to return to [facility]. He stated his significant other lives in an apartment he can stay with her upon d/c.               Functional Status     Row Name 04/05/23 1502       Functional Status    Usual Activity Tolerance excellent    Current Activity Tolerance excellent       Physical Activity    On average, how many days per week do you  engage in moderate to strenuous exercise (like a brisk walk)? 7 days    On average, how many minutes do you engage in exercise at this level? 20 min    Number of minutes of exercise per week 140       Functional Status, IADL    Medications independent    Meal Preparation independent    Housekeeping independent    Laundry independent    Shopping independent       Mental Status    General Appearance WDL WDL       Mental Status Summary    Recent Changes in Mental Status/Cognitive Functioning no changes       Employment/    Employment Status self-employed    Employment/ Comments Patient is self-employed and does side jobs (i.e. cutting down trees).               Psychosocial     Row Name 04/05/23 1506       Values/Beliefs    Spiritual, Cultural Beliefs, Adventist Practices, Values that Affect Care no       Behavior WDL    Behavior WDL WDL       Emotion Mood WDL    Emotion/Mood/Affect WDL WDL       Mental Health    Little Interest or Pleasure in Doing Things 0-->not at all    Feeling Down, Depressed or Hopeless 0-->not at all    Do you feel stress - tense, restless, nervous, or anxious, or unable to sleep at night because your mind is troubled all the time - these days? Not at all       Speech WDL    Speech WDL WDL       Perceptual State WDL    Perceptual State WDL WDL       Thought Process WDL    Thought Process WDL WDL       Intellectual Performance WDL    Intellectual Performance WDL WDL    Level of Consciousness Alert       Coping/Stress    Major Change/Loss/Stressor medical condition/diagnosis    Patient Personal Strengths expressive of emotions;expressive of needs;strong support system;able to adapt;positive attitude;future/goal oriented    Sources of Support significant other;friend(s);other family members    Techniques to [place] with Loss/Stress/Change diversional activities    Reaction to Health Status accepting;realistic    Understanding of Condition and Treatment adequate understanding of  treatment;adequate understanding of medical condition    Coping/Stress Comments Patient reported he his support includes his significant other, April and his sister. Reported he also has friends that help him with his side jobs.       Developmental Stage (Eriksson's)    Developmental Stage Stage 7 (35-65 years/Middle Adulthood) Generativity vs. Stagnation       C-SSRS (Recent)    Q1 Wished to be Dead (Past Month) no    Q2 Suicidal Thoughts (Past Month) no    Q6 Suicide Behavior (Lifetime) no       Violence Risk    Feels Like Hurting Others no    Previous Attempt to Harm Others no               Abuse/Neglect     Row Name 04/05/23 1516       Personal Safety    Feels Unsafe at Home or Work/School no    Feels Threatened by Someone no    Does Anyone Try to Keep You From Having Contact with Others or Doing Things Outside Your Home? no    Physical Signs of Abuse Present no               Legal     Row Name 04/05/23 1516       Financial Resource Strain    How hard is it for you to pay for the very basics like food, housing, medical care, and heating? Not very       Financial/Legal    Source of Income salary/wages               Substance Abuse     Row Name 04/05/23 1516       Substance Use    Substance Use Status past street drug/inhalant/medication abuse    Previous Substance Use Treatment inpatient rehab    Substance Use Comment Patient reported he has been sober for 35 days. His US + for THC. Patient reported he is decided he wanted to completed inpatient rehab and loves [facility], but has been having trouble with some of the other patient's there. Stated he felt like he was going to relapse and use Meth and checked into [facility]. Stated he has been talking to [facility] for their IOP program and they have a spot for him there. LSW advised if he needed assistance with finding another IOP program LSW could assist him with the referral process.       AUDIT-C (Alcohol Use Disorders ID Test)    Q1: How often do you have a  drink containing alcohol? Never    Q2: How many drinks containing alcohol do you have on a typical day when you are drinking? None    Q3: How often do you have six or more drinks on one occasion? Never    Audit-C Score 0                Social Determinants of Health     Tobacco Use: High Risk   • Smoking Tobacco Use: Every Day   • Smokeless Tobacco Use: Former   • Passive Exposure: Not on file   Alcohol Use: Not At Risk   • Frequency of Alcohol Consumption: Never   • Average Number of Drinks: Patient does not drink   • Frequency of Binge Drinking: Never   Financial Resource Strain: Low Risk    • Difficulty of Paying Living Expenses: Not very hard   Food Insecurity: Food Insecurity Present   • Worried About Running Out of Food in the Last Year: Sometimes true   • Ran Out of Food in the Last Year: Sometimes true   Transportation Needs: No Transportation Needs   • Lack of Transportation (Medical): No   • Lack of Transportation (Non-Medical): No   Physical Activity: Insufficiently Active   • Days of Exercise per Week: 7 days   • Minutes of Exercise per Session: 20 min   Stress: No Stress Concern Present   • Feeling of Stress : Not at all   Social Connections: Not on file   Intimate Partner Violence: Not on file   Depression: Not at risk   • PHQ-2 Score: 0   Housing Stability: Not on file       Met with patient in room wearing PPE: mask.    Maintained distance greater than six feet and spent less than 15 minutes in the room.      KWAME Herron, LSW    Phone: [phone]  Cell: [phone]  Fax: [phone]  [email or web address]

## 2023-04-05 NOTE — PLAN OF CARE
Goal Outcome Evaluation:     Problem: Adult Inpatient Plan of Care  Goal: Absence of Hospital-Acquired Illness or Injury  Intervention: Identify and Manage Fall Risk  Recent Flowsheet Documentation  Taken 4/5/2023 1600 by Angelica Owens RN  Safety Promotion/Fall Prevention: (Patient left AMA) patient off unit  Taken 4/5/2023 1400 by Angelica Owens RN  Safety Promotion/Fall Prevention: safety round/check completed  Taken 4/5/2023 1200 by Angelica Owens RN  Safety Promotion/Fall Prevention: safety round/check completed  Taken 4/5/2023 1052 by Angelica Owens RN  Safety Promotion/Fall Prevention: safety round/check completed  Taken 4/5/2023 1000 by Angelica Owens RN  Safety Promotion/Fall Prevention: patient off unit  Taken 4/5/2023 0801 by Angelica Owens RN  Safety Promotion/Fall Prevention: patient off unit  Taken 4/5/2023 0800 by Angelica Owens RN  Safety Promotion/Fall Prevention: safety round/check completed  Taken 4/5/2023 0753 by Angelica Owens RN  Safety Promotion/Fall Prevention: safety round/check completed  Intervention: Prevent Skin Injury  Recent Flowsheet Documentation  Taken 4/5/2023 1200 by Angelica Owens RN  Body Position: position changed independently  Taken 4/5/2023 0753 by Angelica Owens RN  Body Position: position changed independently  Intervention: Prevent and Manage VTE (Venous Thromboembolism) Risk  Recent Flowsheet Documentation  Taken 4/5/2023 0753 by Angelica Owens RN  Activity Management: up ad tracey  Range of Motion: active ROM (range of motion) encouraged  Intervention: Prevent Infection  Recent Flowsheet Documentation  Taken 4/5/2023 0753 by Angelica Owens RN  Infection Prevention: hand hygiene promoted      Patient had a stress and echo this morning. No complaints of chest pain at this time. Will continue to monitor.

## 2023-04-05 NOTE — NURSING NOTE
Called both Juanito Arambula, the patient, and his significant other April. Patient left AMA with IV in his arm. April answered and stated she would try to get him to come back, he did not.

## 2023-04-05 NOTE — DISCHARGE SUMMARY
[facility]    Provider, No Known    CHIEF COMPLAINT:     Chest pain    HISTORY OF PRESENT ILLNESS:    Obtained from ED provider HPI on 4/4/2023:  Context: Patient is a 42-year-old male from [facility] with complaints of chest pain for the last 3 days.  He states he has a squeezing sensation substernal nonradiating.  No associated nausea or diaphoresis.  States he has had increased stress last couple days after being at sunrise.  Does note some exertional dyspnea as well.  Denies any symmetrical pedal edema.  He denies any shortness of breath fever or upper respiratory complaints.  No urinary complaints.  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  He reports positive family history of cardiac disease has never had a stress test or heart cath.  He has a history of hypertension without known history of hyperlipidemia or diabetes.    4/5/2023:  Patient confirms HPI noted above including several day history of chest pain initially described as a heaviness located on the left side of his chest radiating towards his shoulder with some occasional intermittent squeezing type pain.  He notes the pain seems to be somewhat worse with psychologic stress/anxiety but denies any dyspnea, nausea or vomiting, palpitations or peripheral edema.  No personal history of CAD is noted however he does report that his mother had a history of CAD as well as rheumatic heart disease.        Past Medical History:   Diagnosis Date   • Hypertension      Past Surgical History:   Procedure Laterality Date   • ADENOIDECTOMY     • HERNIA REPAIR     • TONSILLECTOMY       History reviewed. No pertinent family history.  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   4/4/2023 at 1430     Allergies:  Codeine and Ampicillin    Immunization History   Administered Date(s) Administered   • Tdap 06/09/2020           REVIEW OF SYSTEMS:    Review of Systems   Constitutional: Negative.   HENT: Negative.    Eyes: Negative.    Cardiovascular: Positive for chest pain.   Respiratory: Negative.    Skin: Negative.    Musculoskeletal: Negative.    Gastrointestinal: Negative.    Genitourinary: Negative.    Neurological: Negative.    Psychiatric/Behavioral: Negative.          Vital Signs  Temp:  [97.4 °F (36.3 °C)-98.5 °F (36.9 °C)] 97.4 °F (36.3 °C)  Heart Rate:  [57-79] 61  Resp:  [16-20] 20  BP: (106-133)/(72-89) 124/79          Physical Exam:  Physical Exam  Constitutional:       General: He is not in acute distress.     Appearance: Normal appearance. He is normal weight. He is not ill-appearing, toxic-appearing or diaphoretic.   HENT:      Head: Normocephalic.      Right Ear: External ear normal.      Left Ear: External ear normal.      Nose: Nose normal.      Mouth/Throat:      Mouth: Mucous membranes are moist.   Eyes:      Extraocular Movements: Extraocular movements intact.   Cardiovascular:      Rate and Rhythm: Normal rate and regular rhythm.      Pulses: Normal pulses.      Heart sounds: Normal heart sounds.   Pulmonary:      Effort: Pulmonary effort is normal.      Breath sounds: Normal breath sounds.   Abdominal:      General: Bowel sounds are normal.      Palpations: Abdomen is soft.      Tenderness: There is no abdominal tenderness.    Musculoskeletal:         General: Normal range of motion.      Cervical back: Normal range of motion.      Right lower leg: No edema.      Left lower leg: No edema.   Skin:     General: Skin is warm and dry.      Capillary Refill: Capillary refill takes less than 2 seconds.      Findings: Bruising: .obscp.   Neurological:      General: No focal deficit present.      Mental Status: He is alert and oriented to person, place, and time.   Psychiatric:         Mood and Affect: Mood normal.         Behavior: Behavior normal.         Thought Content: Thought content normal.         Judgment: Judgment normal.           Emotional Behavior:   Normal   Debilities:  None  Results Review:    I reviewed the patient's new clinical results.  Lab Results (most recent)     Procedure Component Value Units Date/Time    Basic Metabolic Panel [[id number]]  (Abnormal) Collected: 04/05/23 0556    Specimen: Blood from Arm, Right Updated: 04/05/23 0636     Glucose 113 mg/dL      BUN 17 mg/dL      Creatinine 0.97 mg/dL      Sodium 142 mmol/L      Potassium 4.5 mmol/L      Chloride 105 mmol/L      CO2 29.0 mmol/L      Calcium 9.2 mg/dL      BUN/Creatinine Ratio 17.5     Anion Gap 8.0 mmol/L      eGFR 98.7 mL/min/1.73     Narrative:      GFR Normal >60  Chronic Kidney Disease <60  Kidney Failure <15      CBC & Differential [[id number]]  (Abnormal) Collected: 04/05/23 0556    Specimen: Blood from Arm, Right Updated: 04/05/23 0620    Narrative:      The following orders were created for panel order CBC & Differential.  Procedure                               Abnormality         Status                     ---------                               -----------         ------                     CBC Auto Differential[[id number]]        Abnormal            Final result               Scan Slide[[id number]]                                                                    Please view results for these tests on the individual orders.    CBC Auto  Differential [[id number]]  (Abnormal) Collected: 04/05/23 0556    Specimen: Blood from Arm, Right Updated: 04/05/23 0620     WBC 8.20 10*3/mm3      RBC 4.93 10*6/mm3      Hemoglobin 15.8 g/dL      Hematocrit 50.5 %      .3 fL      MCH 32.1 pg      MCHC 31.4 g/dL      RDW 14.4 %      RDW-SD 51.6 fl      MPV 9.3 fL      Platelets 215 10*3/mm3      Neutrophil % 64.1 %      Lymphocyte % 21.7 %      Monocyte % 11.2 %      Eosinophil % 2.2 %      Basophil % 0.8 %      Neutrophils, Absolute 5.30 10*3/mm3      Lymphocytes, Absolute 1.80 10*3/mm3      Monocytes, Absolute 0.90 10*3/mm3      Eosinophils, Absolute 0.20 10*3/mm3      Basophils, Absolute 0.10 10*3/mm3      nRBC 0.1 /100 WBC     High Sensitivity Troponin T 2Hr [[id number]] Collected: 04/04/23 2014    Specimen: Blood Updated: 04/04/23 2046     HS Troponin T <6 ng/L      Troponin T Delta --     Comment: Unable to calculate.       Narrative:      High Sensitive Troponin T Reference Range:  <10.0 ng/L- Negative Female for AMI  <15.0 ng/L- Negative Male for AMI  >=10 - Abnormal Female indicating possible myocardial injury.  >=15 - Abnormal Male indicating possible myocardial injury.   Clinicians would have to utilize clinical acumen, EKG, Troponin, and serial changes to determine if it is an Acute Myocardial Infarction or myocardial injury due to an underlying chronic condition.         Urine Drug Screen - Urine, Clean Catch [[id number]]  (Abnormal) Collected: 04/04/23 1853    Specimen: Urine, Clean Catch Updated: 04/04/23 1932     Amphet/Methamphet, Screen Negative     Barbiturates Screen, Urine Negative     Benzodiazepine Screen, Urine Negative     Cocaine Screen, Urine Negative     Opiate Screen Negative     THC, Screen, Urine Positive     Methadone Screen, Urine Negative     Oxycodone Screen, Urine Negative    Narrative:      Negative Thresholds Per Drugs Screened:    Amphetamines                 500 ng/ml  Barbiturates                 200  ng/ml  Benzodiazepines              100 ng/ml  Cocaine                      300 ng/ml  Methadone                    300 ng/ml  Opiates                      300 ng/ml  Oxycodone                    100 ng/ml  THC                           50 ng/ml    The Normal Value for all drugs tested is negative. This report includes final unconfirmed screening results to be used for medical treatment purposes only. Unconfirmed results must not be used for non-medical purposes such as employment or legal testing. Clinical consideration should be applied to any drug of abuse test, particularly when unconfirmed results are used.          All urine drugs of abuse requests without chain of custody are for medical screening purposes only.  False positives are possible.      Urinalysis With Microscopic If Indicated (No Culture) - Urine, Clean Catch [[id number]]  (Normal) Collected: 04/04/23 1853    Specimen: Urine, Clean Catch Updated: 04/04/23 1910     Color, UA Yellow     Appearance, UA Clear     pH, UA 5.5     Specific Gravity, UA 1.021     Glucose, UA Negative     Ketones, UA Negative     Bilirubin, UA Negative     Blood, UA Negative     Protein, UA Negative     Leuk Esterase, UA Negative     Nitrite, UA Negative     Urobilinogen, UA 1.0 E.U./dL    Narrative:      Urine microscopic not indicated.    [place] Draw [[id number]] Collected: 04/04/23 1745    Specimen: Blood Updated: 04/04/23 1900    Narrative:      The following orders were created for panel order [place] Draw.  Procedure                               Abnormality         Status                     ---------                               -----------         ------                     Green Top (Gel)[[id number]]                                  Final result               Lavender Top[[id number]]                                     Final result               Gold Top - SST[[id number]]                                   Final result               Light Blue Top[[id number]]                                    Final result                 Please view results for these tests on the individual orders.    Lavender Top [[id number]] Collected: 04/04/23 1745    Specimen: Blood Updated: 04/04/23 1900     Extra Tube hold for add-on     Comment: Auto resulted       Light Blue Top [[id number]] Collected: 04/04/23 1745    Specimen: Blood Updated: 04/04/23 1900     Extra Tube Hold for add-ons.     Comment: Auto resulted       Green Top (Gel) [[id number]] Collected: 04/04/23 1745    Specimen: Blood Updated: 04/04/23 1815    Comprehensive Metabolic Panel [[id number]]  (Abnormal) Collected: 04/04/23 1745    Specimen: Blood Updated: 04/04/23 1813     Glucose 107 mg/dL      BUN 15 mg/dL      Creatinine 0.89 mg/dL      Sodium 138 mmol/L      Potassium 5.0 mmol/L      Comment: Slight hemolysis detected by analyzer. Results may be affected.        Chloride 102 mmol/L      CO2 26.0 mmol/L      Calcium 9.7 mg/dL      Total Protein 6.9 g/dL      Albumin 4.4 g/dL      ALT (SGPT) 15 U/L      AST (SGOT) 22 U/L      Comment: Slight hemolysis detected by analyzer. Results may be affected.        Alkaline Phosphatase 106 U/L      Total Bilirubin 0.8 mg/dL      Globulin 2.5 gm/dL      A/G Ratio 1.8 g/dL      BUN/Creatinine Ratio 16.9     Anion Gap 10.0 mmol/L      eGFR 108.4 mL/min/1.73     Narrative:      GFR Normal >60  Chronic Kidney Disease <60  Kidney Failure <15      High Sensitivity Troponin T [[id number]]  (Normal) Collected: 04/04/23 1745    Specimen: Blood Updated: 04/04/23 1812     HS Troponin T 7 ng/L     Narrative:      High Sensitive Troponin T Reference Range:  <10.0 ng/L- Negative Female for AMI  <15.0 ng/L- Negative Male for AMI  >=10 - Abnormal Female indicating possible myocardial injury.  >=15 - Abnormal Male indicating possible myocardial injury.   Clinicians would have to utilize clinical acumen, EKG, Troponin, and serial changes to determine if it is an Acute Myocardial Infarction or myocardial injury due to  an underlying chronic condition.         Protime-INR [[id number]]  (Normal) Collected: 04/04/23 1745    Specimen: Blood Updated: 04/04/23 1809     Protime 10.4 Seconds      INR 1.01    aPTT [[id number]]  (Abnormal) Collected: 04/04/23 1745    Specimen: Blood Updated: 04/04/23 1809     PTT 27.7 seconds     Sedimentation Rate [[id number]]  (Normal) Collected: 04/04/23 1745    Specimen: Blood Updated: 04/04/23 1809     Sed Rate 2 mm/hr     CBC & Differential [[id number]]  (Abnormal) Collected: 04/04/23 1745    Specimen: Blood Updated: 04/04/23 1802    Narrative:      The following orders were created for panel order CBC & Differential.  Procedure                               Abnormality         Status                     ---------                               -----------         ------                     CBC Auto Differential[[id number]]        Abnormal            Final result                 Please view results for these tests on the individual orders.    CBC Auto Differential [[id number]]  (Abnormal) Collected: 04/04/23 1745    Specimen: Blood Updated: 04/04/23 1802     WBC 10.00 10*3/mm3      RBC 5.04 10*6/mm3      Hemoglobin 16.7 g/dL      Hematocrit 49.8 %      MCV 98.8 fL      MCH 33.1 pg      MCHC 33.5 g/dL      RDW 13.9 %      RDW-SD 50.8 fl      MPV 9.4 fL      Platelets 218 10*3/mm3      Neutrophil % 68.1 %      Lymphocyte % 20.7 %      Monocyte % 8.7 %      Eosinophil % 2.0 %      Basophil % 0.5 %      Neutrophils, Absolute 6.80 10*3/mm3      Lymphocytes, Absolute 2.10 10*3/mm3      Monocytes, Absolute 0.90 10*3/mm3      Eosinophils, Absolute 0.20 10*3/mm3      Basophils, Absolute 0.10 10*3/mm3      nRBC 0.1 /100 WBC     Gold Top - SST [[id number]] Collected: 04/04/23 1745    Specimen: Blood Updated: 04/04/23 1758          Imaging Results (Most Recent)     None        reviewed    ECG/EMG Results (most recent)     Procedure Component Value Units Date/Time    ECG 12 Lead Chest Pain [[id number]] Collected: 04/04/23  1735     Updated: 04/04/23 1736     QT Interval 400 ms     Narrative:      HEART RATE= 71  bpm  RR Interval= 848  ms  UT Interval= 161  ms  P Horizontal Axis= -9  deg  P Front Axis= 82  deg  QRSD Interval= 84  ms  QT Interval= 400  ms  QRS Axis= -14  deg  T Wave Axis= 49  deg  - BORDERLINE ECG -  Sinus rhythm  Probable left atrial enlargement  ST elev, probable normal early repol pattern  When compared with ECG of 02-Jun-2013 19:24:30,  Significant rate decrease  Electronically Signed By:   Date and Time of Study: 2023-04-04 17:35:35    SCANNED - TELEMETRY   [[id number]] Resulted: 04/04/23     Updated: 04/05/23 0713        reviewed            Microbiology Results (last 10 days)     ** No results found for the last 240 hours. **          Assessment & Plan     Chest pain       Chest pain  Lab Results   Component Value Date    TROPONINT <6 04/04/2023    TROPONINT 7 04/04/2023   -EKG shows sinus rhythm at 71 without obvious acute changes or ectopy though interpretation algorithm does note some mild ST elevation with probable early repolarization pattern with a QTc of 434 ms  -In the ED pt given Nitropaste  -Check lipid panel  -Stress Test reported with no signs of ischemia and an EF of 47%  -Echocardiogram showed an EF of 60 to 65%  -Chest x-ray showed no active disease and D-dimer was within normal limits  -Cardiology consulted in the ED  -Telemetry  -NPO    Hypertension  -Well controlled   BP Readings from Last 1 Encounters:   04/05/23 124/79   - Continue clonidine  - Monitor while admitted      I discussed the patients findings and my recommendations with patient and nursing staff.     Discharge Diagnosis:      Chest pain      Hospital Course  Patient is a 42 y.o. male presented with several day history of chest pain with an HPI noted above.  Serial troponins were assessed and found to be 7, less than 6 with EKG showing sinus rhythm at 71 without obvious acute changes.  He was continued on telemetry without  significant events noted.  Patient received Nitropaste in the ED.  Lipid panel was assessed.  Stress testing was performed and showed no signs of ischemia with an EF of 47%.  Echocardiogram was also obtained which was reported with an EF of 60 to 65%.  Cardiology was consulted in ED who ordered stress test and D-dimer which were unremarkable.  Prior to final evaluation and disposition patient was found to have left AGAINST MEDICAL ADVICE and without informing staff.    Past Medical History:     Past Medical History:   Diagnosis Date   • Hypertension        Past Surgical History:     Past Surgical History:   Procedure Laterality Date   • ADENOIDECTOMY     • HERNIA REPAIR     • TONSILLECTOMY         Social History:   Social History     Socioeconomic History   • Marital status: Single   Tobacco Use   • Smoking status: Every Day     Packs/day: 0.25     Types: Cigarettes   • Smokeless tobacco: Former     Types: Chew   Vaping Use   • Vaping Use: Former   Substance and Sexual Activity   • Alcohol use: Yes     Comment: socially, 1 beer every 6 months per patient   • Drug use: Yes     Frequency: 3.0 times per week     Types: Marijuana, Heroin     Comment: 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, last used marijuana 7 days ago before going to rehab again so he woud not relapse on heroin   • Sexual activity: Defer       Procedures Performed         Consults:   Consults     Date and Time Order Name Status Description    4/5/2023 12:34 AM Cardiology (on-call MD unless specified)            Condition on Discharge:     Stable    Discharge Disposition      Discharge Medications     Discharge Medications      ASK your doctor about these medications      Instructions Start Date   cloNIDine 0.1 MG tablet  Commonly known as: CATAPRES   0.1 mg, Oral, 2 Times Daily             Discharge Diet:     Activity at Discharge:     Follow-up Appointments  Future Appointments   Date Time Provider Department Center   4/5/2023  9:25 AM [facility]  [facility]/INPATIENT  [facility]   4/5/2023  9:55 AM [facility] ROOM 1  [facility]         Test Results Pending at Discharge       Risk for Readmission (LACE) Score: 1 (4/5/2023  6:00 AM)      Greater than 30 minutes spent in discharge activities for this patient    Dalton Resendez PA-C  04/05/23  09:24 EDT

## 2023-04-05 NOTE — CONSULTS
Cardiology Consult Note    Patient Identification:  Name: Juanito Arambula  Age: 42 y.o.  Sex: male  :  1980  MRN: [id number]             Requesting Physician : Dalton Resendez    Reason for Consultation / Chief Complaint : Chest pain.    History of Present Illness:      Juanito Sommers has a PMH of     -Hypertension  Previous methamphetamine use  Current smoker  Family history of CAD  Previous adenoidectomy tonsillectomy and hernia repair      Presented to the emergency room from rehab facility where he is currently staying for methamphetamine use with chest pain.  Patient reportedly has been sober for 35 days however his urine drug screen is positive for THC.  Patient presented through rehab facility in the emergency room with complaint of chest pain.  Has been going on for 3 days squeezing quality and nonradiating..          Labs in the emergency room 2023 - 2023 reveal  negative serial troponin.  Glucose 107 otherwise CMP unremarkable CBC unremarkable EKG normal sinus rhythm early repolarization        Assessment:  :    Chest pain  Hypertension  Cigarette smoker  History of drug abuse  Hyperglycemia      Recommendations / Plan:        Telemetry is revealing sinus rhythm  Serial troponins are negative.  Counseled on smoking and drug abuse cessation.  Patient presented with chest pain.  Has normal EKG done 2023 revealing sinus rhythm at the rate of 71 bpm with RSR pattern in V1 V2 and early repolarization.    Recommended patient to undergo echo and Lexiscan Cardiolite.  Echocardiogram 2023 reveals normal LV systolic function EF of 60 to 65%  Stress Cardiolite 2023 reveals negative for ischemia EF of 47%.  I suspect EF is underestimated due to sinus arrhythmia and arrhythmia.  Reviewed results with patient.  Advised to call me back if he has any worsening symptoms.           Diagnosis Plan   1. Chest pain, unspecified type                   Past Medical History:  Past Medical  "History:   Diagnosis Date   • Hypertension      Past Surgical History:  Past Surgical History:   Procedure Laterality Date   • ADENOIDECTOMY     • HERNIA REPAIR     • TONSILLECTOMY        Allergies:  Allergies   Allergen Reactions   • Codeine Shortness Of Breath     Pt reports it \"slows his breathing\"   • Ampicillin Unknown - High Severity     Pt reports he can take amoxicillin and penicillin but not ampicillin     Home Meds:  No medications prior to admission.     Current Meds:   No current facility-administered medications for this encounter.    Current Outpatient Medications:   •  cloNIDine (CATAPRES) 0.1 MG tablet, Take 1 tablet by mouth 2 (Two) Times a Day., Disp: , Rfl:   Social History:   Social History     Tobacco Use   • Smoking status: Every Day     Packs/day: 0.25     Types: Cigarettes   • Smokeless tobacco: Former     Types: Chew   Substance Use Topics   • Alcohol use: Yes     Comment: socially, 1 beer every 6 months per patient      Family History:  History reviewed. No pertinent family history.     Review of Systems : Review of Systems   Cardiovascular: Positive for chest pain.   All other systems reviewed and are negative.              Constitutional:  Temp:  [97.4 °F (36.3 °C)-98.4 °F (36.9 °C)] 98 °F (36.7 °C)  Heart Rate:  [57-85] 80  Resp:  [16-20] 16  BP: (106-143)/(72-95) 143/95    Physical Exam   /95 (BP Location: Right arm, Patient Position: Lying)   Pulse 80   Temp 98 °F (36.7 °C) (Oral)   Resp 16   Ht 180.3 cm (71\")   Wt 68 kg (150 lb)   SpO2 98%   BMI 20.92 kg/m²   Physical Exam  General:  Appears in no acute distress  Eyes: Sclerae are anicteric,  conjunctivae are clear   HEENT:  No JVD. Thyroid not visibly enlarged. No mucosal pallor or cyanosis  Respiratory: Respirations regular and unlabored at rest.  Bilaterally good breath sounds with good air entry in all fields. No crackles, rubs or wheezes auscultated  Cardiovascular: S1,S2 Regular rate and rhythm. No murmur, rub or " gallop auscultated. No pretibial pitting edema  Gastrointestinal: Abdomen soft, flat, nontender. Bowel sounds present.   Musculoskeletal:  No abnormal movements  Extremities: No digital clubbing or cyanosis  Skin: Color pink. Skin warm and dry to touch. No rashes  No xanthoma  Neuro: Alert and awake, no lateralizing deficits appreciated    Cardiographics  ECG: EKG tracing was  personally reviewed/interpreted by me  ECG 12 Lead Chest Pain   Preliminary Result   HEART RATE= 71  bpm   RR Interval= 848  ms   [place] Interval= 161  ms   P Horizontal Axis= -9  deg   P Front Axis= 82  deg   QRSD Interval= 84  ms   QT Interval= 400  ms   QRS Axis= -14  deg   T Wave Axis= 49  deg   - BORDERLINE ECG -   Sinus rhythm   Probable left atrial enlargement   ST elev, probable normal early repol pattern   When compared with ECG of 02-Jun-2013 19:24:30,   Significant rate decrease   Electronically Signed By:    Date and Time of Study: 2023-04-04 17:35:35      SCANNED - TELEMETRY     Final Result          Telemetry: Sinus rhythm    Echocardiogram:   Results for orders placed during the hospital encounter of 04/04/23    ADULT TRANSTHORACIC ECHO COMPLETE W/ CONT IF NECESSARY PER PROTOCOL    Interpretation Summary  Normal LV size and contractility EF of 60 to 65%  Normal RV size  Normal atrial size  Pulmonic valve is not well visualized.  Mitral valve prolapse seen.  No significant MR seen.  Aortic valve,  tricuspid valve appears structurally normal, no significant regurgitation seen.  No pericardial effusion seen.  Proximal aorta appears normal in size.      Imaging  Chest X-ray:   Imaging Results (Last 24 Hours)     Procedure Component Value Units Date/Time    XR Chest 1 View [[id number]] Collected: 04/05/23 1222     Updated: 04/05/23 1225    Narrative:      XR CHEST 1 VW    Date of Exam: 4/5/2023 12:19 PM EDT    Indication: chest pain.    Comparison: 6/2/2013    Findings:  Heart size is normal and the lungs appear clear      Impression:       Impression:  No active disease    Electronically Signed: Chris Yan    4/5/2023 12:22 PM EDT    Workstation ID: [id number]          Lab Review: I have reviewed the labs  Results from last 7 days   Lab Units 04/04/23 2014 04/04/23  1745   HSTROP T ng/L <6 7         Results from last 7 days   Lab Units 04/05/23  0556   SODIUM mmol/L 142   POTASSIUM mmol/L 4.5   BUN mg/dL 17   CREATININE mg/dL 0.97   CALCIUM mg/dL 9.2             Results from last 7 days   Lab Units 04/05/23  0556 04/04/23  1745   WBC 10*3/mm3 8.20 10.00   HEMOGLOBIN g/dL 15.8 16.7   HEMATOCRIT % 50.5 49.8   PLATELETS 10*3/mm3 215 218     Results from last 7 days   Lab Units 04/04/23  1745   INR  1.01   APTT seconds 27.7*             Getachew Alanis MD  4/5/2023, 19:27 EDT      EMR Dragon/Transcription:   Dictated utilizing Dragon dictation

## 2023-04-05 NOTE — PLAN OF CARE
Problem: Adult Inpatient Plan of Care  Goal: Plan of Care Review  Outcome: Ongoing, Not Progressing  Flowsheets (Taken 4/5/2023 0522)  Progress: no change  Plan of Care Reviewed With: patient  Outcome Evaluation: pt NPO for myoview/echo/cardiology consult, VSS, SR on tele, no chest pain at this time, plan of care continued  Goal: Patient-Specific Goal (Individualized)  Outcome: Ongoing, Not Progressing  Goal: Absence of Hospital-Acquired Illness or Injury  Outcome: Ongoing, Not Progressing  Goal: Optimal Comfort and Wellbeing  Outcome: Ongoing, Not Progressing  Goal: Readiness for Transition of Care  Outcome: Ongoing, Not Progressing  Intervention: Mutually Develop Transition Plan  Recent Flowsheet Documentation  Taken 4/5/2023 0031 by La Ribera, RN  Transportation Anticipated: family or friend will provide  Patient/Family Anticipated Services at Transition:   Patient/Family Anticipates Transition to: (rehab or home if we cannot find another rehab) other (see comments)  Taken 4/5/2023 0028 by La Ribera, RN  Equipment Currently Used at Home: none     Problem: Hypertension Comorbidity  Goal: Blood Pressure in Desired Range  Outcome: Ongoing, Not Progressing     Problem: Chest Pain  Goal: Resolution of Chest Pain Symptoms  Outcome: Ongoing, Not Progressing   Goal Outcome Evaluation:  Plan of Care Reviewed With: patient        Progress: no change  Outcome Evaluation: pt NPO for myoview/echo/cardiology consult, VSS, SR on tele, no chest pain at this time, plan of care continued

## 2023-04-07 LAB — QT INTERVAL: 400 MS
